# Patient Record
Sex: FEMALE | Race: WHITE
[De-identification: names, ages, dates, MRNs, and addresses within clinical notes are randomized per-mention and may not be internally consistent; named-entity substitution may affect disease eponyms.]

---

## 2017-05-24 ENCOUNTER — HOSPITAL ENCOUNTER (EMERGENCY)
Dept: HOSPITAL 17 - PHED | Age: 22
LOS: 1 days | Discharge: HOME | End: 2017-05-25
Payer: COMMERCIAL

## 2017-05-24 VITALS
DIASTOLIC BLOOD PRESSURE: 88 MMHG | SYSTOLIC BLOOD PRESSURE: 114 MMHG | OXYGEN SATURATION: 100 % | HEART RATE: 110 BPM | RESPIRATION RATE: 18 BRPM | TEMPERATURE: 98.4 F

## 2017-05-24 VITALS — BODY MASS INDEX: 28.46 KG/M2 | WEIGHT: 135.58 LBS | HEIGHT: 58 IN

## 2017-05-24 VITALS — DIASTOLIC BLOOD PRESSURE: 69 MMHG | HEART RATE: 86 BPM | OXYGEN SATURATION: 99 % | SYSTOLIC BLOOD PRESSURE: 117 MMHG

## 2017-05-24 DIAGNOSIS — N83.201: Primary | ICD-10-CM

## 2017-05-24 DIAGNOSIS — J45.909: ICD-10-CM

## 2017-05-24 DIAGNOSIS — R11.2: ICD-10-CM

## 2017-05-24 LAB
ALP SERPL-CCNC: 86 U/L (ref 45–117)
ALT SERPL-CCNC: 23 U/L (ref 10–53)
ANION GAP SERPL CALC-SCNC: 8 MEQ/L (ref 5–15)
APTT BLD: 26.8 SEC (ref 24.3–30.1)
AST SERPL-CCNC: 23 U/L (ref 15–37)
BACTERIA #/AREA URNS HPF: (no result) /HPF
BASOPHILS # BLD AUTO: 0.2 TH/MM3 (ref 0–0.2)
BASOPHILS NFR BLD: 1.6 % (ref 0–2)
BILIRUB INDIRECT SERPL-MCNC: 0.2 MG/DL (ref 0–0.8)
BILIRUB SERPL-MCNC: 0.3 MG/DL (ref 0.2–1)
BUN SERPL-MCNC: 13 MG/DL (ref 7–18)
CHLORIDE SERPL-SCNC: 102 MEQ/L (ref 98–107)
COLOR UR: (no result)
COMMENT (UR): (no result)
CULTURE IF INDICATED: (no result)
EOSINOPHIL # BLD: 0.1 TH/MM3 (ref 0–0.4)
EOSINOPHIL NFR BLD: 0.6 % (ref 0–4)
ERYTHROCYTE [DISTWIDTH] IN BLOOD BY AUTOMATED COUNT: 11.9 % (ref 11.6–17.2)
GFR SERPLBLD BASED ON 1.73 SQ M-ARVRAT: 75 ML/MIN (ref 89–?)
GLUCOSE UR STRIP-MCNC: (no result) MG/DL
HCO3 BLD-SCNC: 30.2 MEQ/L (ref 21–32)
HCT VFR BLD CALC: 47.7 % (ref 35–46)
HEMO FLAGS: (no result)
HGB UR QL STRIP: (no result)
INR PPP: 1 RATIO
KETONES UR STRIP-MCNC: (no result) MG/DL
LEUKOCYTE ESTERASE UR QL STRIP: (no result) /HPF (ref 0–5)
LYMPHOCYTES # BLD AUTO: 2.1 TH/MM3 (ref 1–4.8)
LYMPHOCYTES NFR BLD AUTO: 15.8 % (ref 9–44)
MCH RBC QN AUTO: 30.8 PG (ref 27–34)
MCHC RBC AUTO-ENTMCNC: 34.3 % (ref 32–36)
MCV RBC AUTO: 89.8 FL (ref 80–100)
MONOCYTES NFR BLD: 4.4 % (ref 0–8)
NEUTROPHILS # BLD AUTO: 10 TH/MM3 (ref 1.8–7.7)
NEUTROPHILS NFR BLD AUTO: 77.6 % (ref 16–70)
NITRITE UR QL STRIP: (no result)
PLATELET # BLD: 369 TH/MM3 (ref 150–450)
POTASSIUM SERPL-SCNC: 3.4 MEQ/L (ref 3.5–5.1)
PROTHROMBIN TIME: 10.7 SEC (ref 9.8–11.6)
RBC # BLD AUTO: 5.31 MIL/MM3 (ref 4–5.3)
RBC #/AREA URNS HPF: (no result) /HPF (ref 0–3)
SODIUM SERPL-SCNC: 140 MEQ/L (ref 136–145)
SP GR UR STRIP: 1.01 (ref 1–1.03)
SQUAMOUS #/AREA URNS HPF: > 8 /HPF (ref 0–5)
WBC # BLD AUTO: 13 TH/MM3 (ref 4–11)

## 2017-05-24 PROCEDURE — 83690 ASSAY OF LIPASE: CPT

## 2017-05-24 PROCEDURE — 74177 CT ABD & PELVIS W/CONTRAST: CPT

## 2017-05-24 PROCEDURE — 85025 COMPLETE CBC W/AUTO DIFF WBC: CPT

## 2017-05-24 PROCEDURE — 85730 THROMBOPLASTIN TIME PARTIAL: CPT

## 2017-05-24 PROCEDURE — 96375 TX/PRO/DX INJ NEW DRUG ADDON: CPT

## 2017-05-24 PROCEDURE — 96361 HYDRATE IV INFUSION ADD-ON: CPT

## 2017-05-24 PROCEDURE — 81001 URINALYSIS AUTO W/SCOPE: CPT

## 2017-05-24 PROCEDURE — 84703 CHORIONIC GONADOTROPIN ASSAY: CPT

## 2017-05-24 PROCEDURE — 99285 EMERGENCY DEPT VISIT HI MDM: CPT

## 2017-05-24 PROCEDURE — 85610 PROTHROMBIN TIME: CPT

## 2017-05-24 PROCEDURE — 96374 THER/PROPH/DIAG INJ IV PUSH: CPT

## 2017-05-24 PROCEDURE — 80076 HEPATIC FUNCTION PANEL: CPT

## 2017-05-24 PROCEDURE — 80048 BASIC METABOLIC PNL TOTAL CA: CPT

## 2017-05-24 PROCEDURE — 87086 URINE CULTURE/COLONY COUNT: CPT

## 2017-05-24 NOTE — RADHPO
EXAM DATE/TIME:  05/24/2017 23:20 

 

[facility] COMPARISON:     

No previous studies available for comparison.

 

 

INDICATIONS :     

Abdominal pain with nausea and vomiting.

                      

 

IV CONTRAST:     

100 cc Omnipaque 350 (iohexol) IV 

 

 

ORAL CONTRAST:      

No oral contrast ingested.

                      

 

RADIATION DOSE:     

8.96 CTDIvol (mGy) 

 

 

MEDICAL HISTORY :     

None  

 

SURGICAL HISTORY :      

None. 

 

ENCOUNTER:      

Initial

 

ACUITY:      

1 day

 

PAIN SCALE:      

6/10

 

LOCATION:         

abdomen

 

TECHNIQUE:     

Volumetric scanning of the abdomen and pelvis was performed.  Using automated exposure control and ad
justment of the mA and/or kV according to patient size, radiation dose was kept as low as reasonably 
achievable to obtain optimal diagnostic quality images. 

 

FINDINGS:     

 

LOWER LUNGS:     

The visualized lower lungs are clear.

 

LIVER:     

Homogeneous density without lesion.  There is no dilation of the biliary tree.  No calcified gallston
es.

 

SPLEEN:     

Normal size without lesion.

 

PANCREAS:     

Within normal limits.

 

KIDNEYS:     

Normal in size and shape.  There is no mass, stone or hydronephrosis.

 

ADRENAL GLANDS:     

Within normal limits.

 

VASCULAR:     

There is no aortic aneurysm.

 

BOWEL/MESENTERY:     

The stomach, small bowel, and colon demonstrate no acute abnormality.  There is no free intraperitone
al air or fluid.

 

ABDOMINAL WALL:     

Within normal limits.

 

RETROPERITONEUM:     

There is no lymphadenopathy. 

 

BLADDER:     

No wall thickening or mass. 

 

REPRODUCTIVE:     

3 cm right ovarian cyst.

 

INGUINAL:     

There is no lymphadenopathy or hernia. 

 

MUSCULOSKELETAL:     

Within normal limits for patient age. 

 

CONCLUSION:     

3 cm right ovarian cyst otherwise unremarkable CT scan of the abdomen and pelvis. Some mixed density 
within the gallbladder may indicate a noncalcified gallstone. Nothing to suggest acute cholecystitis

 

 

 

 Lino Frazier MD on May 24, 2017 at 23:42           

Board Certified Radiologist.

 This report was verified electronically.

## 2017-05-24 NOTE — PD
HPI


Chief Complaint:  GI Complaint


Time Seen by Provider:  22:14


Travel History


International Travel<30 days:  No


Contact w/Intl Traveler<30days:  No


Traveled to known affect area:  No





History of Present Illness


HPI


Patient is a 21-year-old female comes in complaining of abdominal pain.  She 

says that for the past few weeks she has been feeling very nauseous and has 

vomited a few times.  However she started to have right-sided abdominal pain a 

few days ago.  She says the pain is gotten worse, and her friends and family 

told her she needed to come to the hospital.  She says the pain is mostly in 

her right upper quadrant, but goes down the right side.  She denies any 

dysuria.  The last time she vomited was a few days ago.  She has not had any 

fever or chills.





PFSH


Past Medical History


Asthma:  Yes


Diminished Hearing:  No


Tetanus Vaccination:  > 5 Years


Influenza Vaccination:  No


Pregnant?:  Unknown


LMP:  5-3-17 irregular


:  2


Para:  0


Miscarriage:  2


:  0





Past Surgical History


Oral Surgery:  Yes (WISDOM TEETH)





Social History


Alcohol Use:  Yes (SOC)


Tobacco Use:  No


Substance Use:  No





Allergies-Medications


(Allergen,Severity, Reaction):  


Coded Allergies:  


     No Known Allergies (Unverified , 17)


Reported Meds & Prescriptions








Reported Meds & Active Scripts


Active


Lortab (Hydrocodone-Acetaminophen) 5-325 Mg Tab 1 Tab PO Q6H PRN


Zofran Odt (Ondansetron Odt) 4 Mg Tab 4 Mg SL Q6HR PRN











Review of Systems


Except as stated in HPI:  all other systems reviewed are Neg


General / Constitutional:  No: Fever, Chills


HENT:  No: Headaches


Cardiovascular:  No: Chest Pain or Discomfort


Respiratory:  No: Shortness of Breath


Gastrointestinal:  Positive: Nausea, Vomiting, Abdominal Pain,  No: Diarrhea


Genitourinary:  No: Dysuria, Discharge, Vaginal Bleeding


Skin:  No Rash, No Change in Pigmentation


Neurologic:  No: Weakness, Dizziness





Physical Exam


Narrative


GENERAL: Awake and alert, no acute distress.


SKIN: Focused skin assessment warm/dry.


HEAD: Atraumatic. Normocephalic. 


EYES: Pupils equal and round. No scleral icterus. 


ENT: Mucous membranes pink and moist.


NECK: Trachea midline. No JVD. 


CARDIOVASCULAR: Regular rate and rhythm.  No murmur appreciated.


RESPIRATORY: No accessory muscle use. Clear to auscultation. Breath sounds 

equal bilaterally. 


GASTROINTESTINAL: Abdomen soft, nondistended.  Tender to palpation of the right 

upper quadrant and right lower quadrant.  No rebound or guarding.  No CVA 

tenderness.


MUSCULOSKELETAL: No obvious deformities. No clubbing.  No cyanosis.  No edema. 


NEUROLOGICAL: Awake and alert. No obvious cranial nerve deficits.  Motor 

grossly within normal limits. Normal speech.


PSYCHIATRIC: Appropriate mood and affect; insight and judgment normal.





Data


Data


Last Documented VS





Vital Signs








  Date Time  Temp Pulse Resp B/P Pulse Ox O2 Delivery O2 Flow Rate FiO2


 


17 23:49  86  117/69 99 Room Air  


 


17 21:52 98.4  18     








Orders





 Basic Metabolic Panel (Bmp) (17 22:14)


Complete Blood Count With Diff (17 22:14)


Lipase (17 22:14)


Prothrombin Time / Inr (Pt) (17 22:14)


Act Partial Throm Time (Ptt) (17 22:14)


Urinalysis - C+S If Indicated (17 22:14)


Ua Includes Microscopic (17 22:14)


Ct Abd/Pel W Iv Contrast(Rout) (17 22:14)


Iv Access Insert/Monitor (17 22:14)


Ecg Monitoring (17 22:14)


Oximetry (17 22:14)


Ondansetron Inj (Zofran Inj) (17 22:15)


Sodium Chlor 0.9% 1000 Ml Inj (Ns 1000 M (17 22:14)


Sodium Chloride 0.9% Flush (Ns Flush) (17 22:15)


Ed Urine Pregnancytest Poc (17 22:14)


Hepatic Functional Panel (17 22:14)


Ketorolac Inj (Toradol Inj) (17 22:15)


Urine Culture (17 22:19)


Iohexol 350 Inj (Omnipaque 350 Inj) (17 23:29)





Labs





 Laboratory Tests








Test 17





 22:19 23:05


 


Urine Color STRAW  


 


Urine Turbidity SLIGHT  


 


Urine pH 6.0  


 


Urine Specific Gravity 1.006  


 


Urine Protein NEG mg/dL 


 


Urine Glucose (UA) NEG mg/dL 


 


Urine Ketones NEG mg/dL 


 


Urine Occult Blood NEG  


 


Urine Nitrite NEG  


 


Urine Bilirubin NEG  


 


Urine Leukocyte Esterase NEG  


 


Urine RBC 0-2 /hpf 


 


Urine WBC 0-2 /hpf 


 


Urine Squamous Epithelial > 8 /hpf 





Cells  


 


Urine Bacteria MANY /hpf 


 


Microscopic Urinalysis Comment CULTURE 





 INDICATED 


 


White Blood Count  13.0 TH/MM3


 


Red Blood Count  5.31 MIL/MM3


 


Hemoglobin  16.4 GM/DL


 


Hematocrit  47.7 %


 


Mean Corpuscular Volume  89.8 FL


 


Mean Corpuscular Hemoglobin  30.8 PG


 


Mean Corpuscular Hemoglobin  34.3 %





Concent  


 


Red Cell Distribution Width  11.9 %


 


Platelet Count  369 TH/MM3


 


Mean Platelet Volume  8.8 FL


 


Neutrophils (%) (Auto)  77.6 %


 


Lymphocytes (%) (Auto)  15.8 %


 


Monocytes (%) (Auto)  4.4 %


 


Eosinophils (%) (Auto)  0.6 %


 


Basophils (%) (Auto)  1.6 %


 


Neutrophils # (Auto)  10.0 TH/MM3


 


Lymphocytes # (Auto)  2.1 TH/MM3


 


Monocytes # (Auto)  0.6 TH/MM3


 


Eosinophils # (Auto)  0.1 TH/MM3


 


Basophils # (Auto)  0.2 TH/MM3


 


CBC Comment  DIFF FINAL 


 


Differential Comment   


 


Prothrombin Time  10.7 SEC


 


Prothromb Time International  1.0 RATIO





Ratio  


 


Activated Partial  26.8 SEC





Thromboplast Time  


 


Sodium Level  140 MEQ/L


 


Potassium Level  3.4 MEQ/L


 


Chloride Level  102 MEQ/L


 


Carbon Dioxide Level  30.2 MEQ/L


 


Anion Gap  8 MEQ/L


 


Blood Urea Nitrogen  13 MG/DL


 


Creatinine  0.93 MG/DL


 


Estimat Glomerular Filtration  75 ML/MIN





Rate  


 


Random Glucose  100 MG/DL


 


Calcium Level  9.1 MG/DL


 


Total Bilirubin  0.3 MG/DL


 


Direct Bilirubin  LESS THAN 0.1





  MG/DL


 


Indirect Bilirubin  0.2 MG/DL


 


Aspartate Amino Transf  23 U/L





(AST/SGOT)  


 


Alanine Aminotransferase  23 U/L





(ALT/SGPT)  


 


Alkaline Phosphatase  86 U/L


 


Total Protein  8.0 GM/DL


 


Albumin  4.0 GM/DL


 


Lipase  244 U/L











[facility]


Medical Decision Making


Medical Screen Exam Complete:  Yes


Emergency Medical Condition:  Yes


Medical Record Reviewed:  Yes


Differential Diagnosis


UTI versus pyelonephritis versus appendicitis versus cholecystitis


Narrative Course


Patient is a 21-year-old female comes in complaining of nausea, vomiting, 

abdominal pain.  Exam shows tenderness to the right side of the abdomen.  IV 

established, labs sent.  Labs show an elevated white blood cell count of 13, no 

other acute abnormalities.  Patient given IV fluids, Zofran, Toradol.





CT abd/pelvis shows ovarian cyst, no other acute abnormalities.  





Patient informed of the results.  Advised to follow up with gyn.  Advised to 

follow up with surgery regarding gallstones, at this time she has no evidence 

of cholecystitis.  Advised to return to the ED as needed for any worsening 

symptoms.





Diagnosis





 Primary Impression:  


 Ovarian cyst


Patient Instructions:  General Instructions, Ovarian Cyst (ED)





***Additional Instructions:


Follow up with your primary care doctor and gyn.  Take Ibuprofen or Naproxen as 

needed for pain.  You can take a Tramadol for severe pain.  Return to the ED as 

needed for any worsening symptoms.


Disposition:  01 DISCHARGE HOME


Condition:  Stable








Kira Fernandez MD May 24, 2017 23:43

## 2017-05-28 ENCOUNTER — HOSPITAL ENCOUNTER (OUTPATIENT)
Dept: HOSPITAL 17 - NEPD | Age: 22
Setting detail: OBSERVATION
LOS: 3 days | Discharge: HOME | End: 2017-05-31
Attending: SURGERY | Admitting: SURGERY
Payer: MEDICAID

## 2017-05-28 VITALS
OXYGEN SATURATION: 98 % | HEART RATE: 91 BPM | RESPIRATION RATE: 17 BRPM | DIASTOLIC BLOOD PRESSURE: 62 MMHG | SYSTOLIC BLOOD PRESSURE: 113 MMHG

## 2017-05-28 VITALS
HEART RATE: 94 BPM | SYSTOLIC BLOOD PRESSURE: 129 MMHG | RESPIRATION RATE: 16 BRPM | DIASTOLIC BLOOD PRESSURE: 78 MMHG | OXYGEN SATURATION: 98 %

## 2017-05-28 VITALS — BODY MASS INDEX: 27.77 KG/M2 | HEIGHT: 58 IN | WEIGHT: 132.28 LBS

## 2017-05-28 VITALS
SYSTOLIC BLOOD PRESSURE: 115 MMHG | TEMPERATURE: 98 F | OXYGEN SATURATION: 98 % | DIASTOLIC BLOOD PRESSURE: 73 MMHG | RESPIRATION RATE: 21 BRPM | HEART RATE: 80 BPM

## 2017-05-28 VITALS
DIASTOLIC BLOOD PRESSURE: 73 MMHG | SYSTOLIC BLOOD PRESSURE: 115 MMHG | OXYGEN SATURATION: 98 % | RESPIRATION RATE: 16 BRPM | HEART RATE: 94 BPM

## 2017-05-28 VITALS
HEART RATE: 78 BPM | RESPIRATION RATE: 16 BRPM | OXYGEN SATURATION: 99 % | DIASTOLIC BLOOD PRESSURE: 92 MMHG | SYSTOLIC BLOOD PRESSURE: 146 MMHG | TEMPERATURE: 98.6 F

## 2017-05-28 DIAGNOSIS — K38.8: Primary | ICD-10-CM

## 2017-05-28 DIAGNOSIS — K80.10: ICD-10-CM

## 2017-05-28 DIAGNOSIS — J45.909: ICD-10-CM

## 2017-05-28 LAB
ALP SERPL-CCNC: 81 U/L (ref 45–117)
ALT SERPL-CCNC: 24 U/L (ref 10–53)
ANION GAP SERPL CALC-SCNC: 9 MEQ/L (ref 5–15)
APTT BLD: 28 SEC (ref 24.3–30.1)
AST SERPL-CCNC: 21 U/L (ref 15–37)
BASOPHILS # BLD AUTO: 0 TH/MM3 (ref 0–0.2)
BASOPHILS NFR BLD: 0.4 % (ref 0–2)
BILIRUB SERPL-MCNC: 0.3 MG/DL (ref 0.2–1)
BUN SERPL-MCNC: 13 MG/DL (ref 7–18)
CHLORIDE SERPL-SCNC: 102 MEQ/L (ref 98–107)
COLOR UR: (no result)
COMMENT (UR): (no result)
CULTURE IF INDICATED: (no result)
EOSINOPHIL # BLD: 0.2 TH/MM3 (ref 0–0.4)
EOSINOPHIL NFR BLD: 1.5 % (ref 0–4)
ERYTHROCYTE [DISTWIDTH] IN BLOOD BY AUTOMATED COUNT: 13 % (ref 11.6–17.2)
GFR SERPLBLD BASED ON 1.73 SQ M-ARVRAT: 74 ML/MIN (ref 89–?)
GLUCOSE UR STRIP-MCNC: (no result) MG/DL
HCO3 BLD-SCNC: 28.2 MEQ/L (ref 21–32)
HCT VFR BLD CALC: 44.6 % (ref 35–46)
HEMO FLAGS: (no result)
HGB UR QL STRIP: (no result)
INR PPP: 0.9 RATIO
KETONES UR STRIP-MCNC: (no result) MG/DL
LYMPHOCYTES # BLD AUTO: 2 TH/MM3 (ref 1–4.8)
LYMPHOCYTES NFR BLD AUTO: 19.2 % (ref 9–44)
MCH RBC QN AUTO: 30.7 PG (ref 27–34)
MCHC RBC AUTO-ENTMCNC: 33.9 % (ref 32–36)
MCV RBC AUTO: 90.7 FL (ref 80–100)
MONOCYTES NFR BLD: 5 % (ref 0–8)
NEUTROPHILS # BLD AUTO: 7.9 TH/MM3 (ref 1.8–7.7)
NEUTROPHILS NFR BLD AUTO: 73.9 % (ref 16–70)
NITRITE UR QL STRIP: (no result)
PLATELET # BLD: 314 TH/MM3 (ref 150–450)
POTASSIUM SERPL-SCNC: 3.5 MEQ/L (ref 3.5–5.1)
PROTHROMBIN TIME: 10.2 SEC (ref 9.8–11.6)
RBC # BLD AUTO: 4.91 MIL/MM3 (ref 4–5.3)
SODIUM SERPL-SCNC: 139 MEQ/L (ref 136–145)
SP GR UR STRIP: 1 (ref 1–1.03)
SQUAMOUS #/AREA URNS HPF: 1 /HPF (ref 0–5)
WBC # BLD AUTO: 10.6 TH/MM3 (ref 4–11)

## 2017-05-28 PROCEDURE — 85025 COMPLETE CBC W/AUTO DIFF WBC: CPT

## 2017-05-28 PROCEDURE — 96374 THER/PROPH/DIAG INJ IV PUSH: CPT

## 2017-05-28 PROCEDURE — G0378 HOSPITAL OBSERVATION PER HR: HCPCS

## 2017-05-28 PROCEDURE — 47562 LAPAROSCOPIC CHOLECYSTECTOMY: CPT

## 2017-05-28 PROCEDURE — 94640 AIRWAY INHALATION TREATMENT: CPT

## 2017-05-28 PROCEDURE — 85730 THROMBOPLASTIN TIME PARTIAL: CPT

## 2017-05-28 PROCEDURE — 99285 EMERGENCY DEPT VISIT HI MDM: CPT

## 2017-05-28 PROCEDURE — 80053 COMPREHEN METABOLIC PANEL: CPT

## 2017-05-28 PROCEDURE — C9113 INJ PANTOPRAZOLE SODIUM, VIA: HCPCS

## 2017-05-28 PROCEDURE — 96375 TX/PRO/DX INJ NEW DRUG ADDON: CPT

## 2017-05-28 PROCEDURE — 84703 CHORIONIC GONADOTROPIN ASSAY: CPT

## 2017-05-28 PROCEDURE — 44970 LAPAROSCOPY APPENDECTOMY: CPT

## 2017-05-28 PROCEDURE — 76705 ECHO EXAM OF ABDOMEN: CPT

## 2017-05-28 PROCEDURE — 83690 ASSAY OF LIPASE: CPT

## 2017-05-28 PROCEDURE — 85610 PROTHROMBIN TIME: CPT

## 2017-05-28 PROCEDURE — 81001 URINALYSIS AUTO W/SCOPE: CPT

## 2017-05-28 PROCEDURE — 88304 TISSUE EXAM BY PATHOLOGIST: CPT

## 2017-05-28 RX ADMIN — PHENYTOIN SODIUM SCH MLS/HR: 50 INJECTION INTRAMUSCULAR; INTRAVENOUS at 18:35

## 2017-05-28 RX ADMIN — Medication SCH ML: at 20:55

## 2017-05-28 RX ADMIN — TAZOBACTAM SODIUM AND PIPERACILLIN SODIUM SCH MLS/HR: 375; 3 INJECTION, SOLUTION INTRAVENOUS at 19:53

## 2017-05-28 RX ADMIN — PANTOPRAZOLE SODIUM SCH MG: 40 INJECTION, POWDER, FOR SOLUTION INTRAVENOUS at 18:57

## 2017-05-28 NOTE — PD
HPI


Chief Complaint:  Abdominal Pain


Time Seen by Provider:  16:41


Travel History


International Travel<30 days:  No


Contact w/Intl Traveler<30days:  No


Traveled to known affect area:  No





History of Present Illness


HPI


[age] female complains of abdominal pain with nausea vomiting.  Patient states 

that she has intermittent right upper quadrant abdominal pain with nausea 

vomiting for the past 6 weeks.  Patient states that the symptoms are worse a 

week ago.  Patient was seen in emergency room 3 days ago.  Patient had blood 

tests and CT scan abdomen pelvis done.  CBC with elevated white count.  CT scan 

abdomen and pelvis at that time shows mixed density within the gallbladder may 

indicate  noncalcified gallstone.  No evidence of acute cholecystitis at that 

time.  Patient was discharged home with pain medication and advised to follow-

up with personal physician and surgeon.  She states that the pain and nausea 

vomiting got worse since then.  Patient also has intermittent fever and chills.

  Patient states the pain is sharp pain localized to right upper quadrant in 

the abdomen.  The pain is worse with coughing and deep breathing.  On a scale 

of 1-10 the pain is a 9.  Patient denies any dysuria or frequency.  Patient 

denies any vaginal discharge or bleeding.





PFSH


Past Medical History


Asthma:  Yes


Diminished Hearing:  No


Gastrointestinal Disorders:  Yes


Reproductive:  Yes


Tetanus Vaccination:  > 5 Years


Influenza Vaccination:  No


Pregnant?:  Not Pregnant


LMP:  17


:  2


Para:  0


Miscarriage:  2


:  0





Past Surgical History


Oral Surgery:  Yes (WISDOM TEETH)





Social History


Alcohol Use:  Yes (SOC)


Tobacco Use:  No


Substance Use:  No





Allergies-Medications


(Allergen,Severity, Reaction):  


Coded Allergies:  


     No Known Allergies (Unverified , 17)


Reported Meds & Prescriptions





Reported Meds & Active Scripts


Active


Lortab (Hydrocodone-Acetaminophen) 5-325 Mg Tab 1 Tab PO Q6H PRN


Zofran Odt (Ondansetron Odt) 4 Mg Tab 4 Mg SL Q6HR PRN








Review of Systems


General / Constitutional:  No: Fever


Eyes:  No: Visual changes


HENT:  No: Headaches


Cardiovascular:  No: Chest Pain or Discomfort


Respiratory:  No: Shortness of Breath


Gastrointestinal:  Positive: Nausea, Vomiting, Abdominal Pain


Genitourinary:  No: Dysuria


Musculoskeletal:  No: Pain


Skin:  No Rash


Neurologic:  No: Weakness


Psychiatric:  No: Depression


Endocrine:  No: Polydipsia


Hematologic/Lymphatic:  No: Easy Bruising





Physical Exam


Narrative


GENERAL: Well-nourished, well-developed patient.


SKIN: Focused skin assessment warm/dry.


HEAD: Normocephalic.


EYES: No scleral icterus. No injection or drainage. 


NECK: Supple, trachea midline. No JVD or lymphadenopathy.


CARDIOVASCULAR: Regular rate and rhythm without murmurs, gallops, or rubs. 


RESPIRATORY: Breath sounds equal bilaterally. No accessory muscle use.


GASTROINTESTINAL: Abdomen soft, nondistended.  Patient has moderate tenderness 

on palpation right upper quadrant of the abdomen.  No rebound tenderness.  No 

mass.


MUSCULOSKELETAL: No cyanosis, or edema. 


BACK: Nontender without obvious deformity. No CVA tenderness.





Data


Data


Last Documented VS





Vital Signs








  Date Time  Temp Pulse Resp B/P Pulse Ox O2 Delivery O2 Flow Rate FiO2


 


17 18:00  94 16 115/73 98 Room Air  


 


17 16:32 98.6       








Orders





 Complete Blood Count With Diff (17 16:53)


Comprehensive Metabolic Panel (17 16:53)


Lipase (17 16:53)


Prothrombin Time / Inr (Pt) (17 16:53)


Act Partial Throm Time (Ptt) (17 16:53)


Urinalysis - C+S If Indicated (17 16:53)


Us Abdomen Gallbladder (17 )


Iv Access Insert/Monitor (17 16:53)


Ecg Monitoring (17 16:53)


Oximetry (17 16:53)


Morphine Inj (Morphine Inj) (17 17:00)


Ondansetron Inj (Zofran Inj) (17 17:00)


Pantoprazole Inj (Protonix Inj) (17 17:00)


Sodium Chlor 0.9% 1000 Ml Inj (Ns 1000 M (17 16:53)


Ed Urine Pregnancytest Poc (17 16:53)


Admit Order (Ed Use Only) (17 18:21)





Labs





 Laboratory Tests








Test 17





 17:00 17:25


 


White Blood Count 10.6 TH/MM3 


 


Red Blood Count 4.91 MIL/MM3 


 


Hemoglobin 15.1 GM/DL 


 


Hematocrit 44.6 % 


 


Mean Corpuscular Volume 90.7 FL 


 


Mean Corpuscular Hemoglobin 30.7 PG 


 


Mean Corpuscular Hemoglobin 33.9 % 





Concent  


 


Red Cell Distribution Width 13.0 % 


 


Platelet Count 314 TH/MM3 


 


Mean Platelet Volume 8.9 FL 


 


Neutrophils (%) (Auto) 73.9 % 


 


Lymphocytes (%) (Auto) 19.2 % 


 


Monocytes (%) (Auto) 5.0 % 


 


Eosinophils (%) (Auto) 1.5 % 


 


Basophils (%) (Auto) 0.4 % 


 


Neutrophils # (Auto) 7.9 TH/MM3 


 


Lymphocytes # (Auto) 2.0 TH/MM3 


 


Monocytes # (Auto) 0.5 TH/MM3 


 


Eosinophils # (Auto) 0.2 TH/MM3 


 


Basophils # (Auto) 0.0 TH/MM3 


 


CBC Comment DIFF FINAL  


 


Differential Comment   


 


Prothrombin Time 10.2 SEC 


 


Prothromb Time International 0.9 RATIO 





Ratio  


 


Activated Partial 28.0 SEC 





Thromboplast Time  


 


Sodium Level 139 MEQ/L 


 


Potassium Level 3.5 MEQ/L 


 


Chloride Level 102 MEQ/L 


 


Carbon Dioxide Level 28.2 MEQ/L 


 


Anion Gap 9 MEQ/L 


 


Blood Urea Nitrogen 13 MG/DL 


 


Creatinine 0.95 MG/DL 


 


Estimat Glomerular Filtration 74 ML/MIN 





Rate  


 


Random Glucose 111 MG/DL 


 


Calcium Level 8.8 MG/DL 


 


Total Bilirubin 0.3 MG/DL 


 


Aspartate Amino Transf 21 U/L 





(AST/SGOT)  


 


Alanine Aminotransferase 24 U/L 





(ALT/SGPT)  


 


Alkaline Phosphatase 81 U/L 


 


Total Protein 7.4 GM/DL 


 


Albumin 3.7 GM/DL 


 


Lipase 199 U/L 


 


Urine Color  LIGHT-YELLOW 


 


Urine Turbidity  CLEAR 


 


Urine pH  6.5 


 


Urine Specific Gravity  1.005 


 


Urine Protein  NEG mg/dL


 


Urine Glucose (UA)  NEG mg/dL


 


Urine Ketones  NEG mg/dL


 


Urine Occult Blood  NEG 


 


Urine Nitrite  NEG 


 


Urine Bilirubin  NEG 


 


Urine Urobilinogen  LESS THAN 2.0





  MG/DL


 


Urine Leukocyte Esterase  NEG 


 


Urine RBC  LESS THAN 1





  /hpf


 


Urine WBC  LESS THAN 1





  /hpf


 


Urine Squamous Epithelial  1 /hpf





Cells  


 


Microscopic Urinalysis Comment  CULT NOT





  INDICATED











MDM


Medical Decision Making


Medical Screen Exam Complete:  Yes


Emergency Medical Condition:  Yes


Interpretation(s)


1743 PM.  CBC within normal limit.  CMP within normal limit.


Differential Diagnosis


Differential diagnosis including acute cholecystitis, cholangitis, pancreatitis

, colitis, UTI, pyelonephritis, nephrolithiasis.


Narrative Course


21-year-old female with recurrent right upper quadrant abdominal pain and 

nausea vomiting.  Normal saline solution 1 25 cc an hour.  Morphine 2 mg IV.  

Zofran 4 mg IV.  Protonix 40 mg IV.  Zosyn 3.375 g IV.





Diagnosis





 Primary Impression:  


 Cholelithiasis


 Qualified Code:  K80.20 - Calculus of gallbladder without cholecystitis 

without obstruction


 Additional Impression:  


 Abdominal pain


 Qualified Code:  R10.11 - Right upper quadrant abdominal pain





Admitting Information


Admitting Physician Requests:  Observation








Andres Rios MD May 28, 2017 17:04

## 2017-05-28 NOTE — RADRPT
EXAM DATE/TIME:  05/28/2017 17:40 

 

[facility] COMPARISON:     

No previous studies available for comparison.

        

 

 

INDICATIONS :     

Right upper quadrant pain.

                     

 

MEDICAL HISTORY :     

Pregnancy.     Asthma.

 

SURGICAL HISTORY :     

None.     

 

ENCOUNTER:     

Initial

 

ACUITY:     

1 month

 

PAIN SCORE:     

8/10

 

LOCATION:     

Right upper quadrant 

                     

MEASUREMENTS:     

 

LIVER:     

12.9 cm length 

 

COMMON DUCT:     

5 mm

 

RIGHT KIDNEY:     

9.6 x 3.9 x 3.9 cm

 

FINDINGS:     

 

LIVER:     

Normal echotexture without focal lesion or ductal dilatation.  

 

COMMON DUCT:     

No intraluminal mass or stone visualized.  

 

GALLBLADDER:          

Gallstone is identified within the gallbladder measuring 12 mm. No significant wall thickening, peric
holecystic fluid or sonographic Foy's sign is noted. 

 

PANCREAS:          

The visualized portions are within normal limits.  

 

RIGHT KIDNEY:          

No evidence of hydronephrosis, stone, or mass.  

 

CONCLUSION:     Cholelithiasis. Otherwise unremarkable sonogram of the abdomen. 

 

 

 Sarmad Seay MD on May 28, 2017 at 18:32           

Board Certified Radiologist.

 This report was verified electronically.

## 2017-05-28 NOTE — MH
cc:

LOUISE BHANDARI

****

 

 

DATE OF CONSULTATION

5/28/17

 

CHIEF COMPLAINT

Acute cholecystitis.

 

HISTORY OF PRESENT ILLNESS

The patient is a 21-year-old female with a known history of gallstones and

right upper quadrant pain who developed severe unrelenting 10/10 pain in

epigastric area earlier today.  The patient's presented to the emergency

department, underwent evaluation by Dr. Rios, the emergency room physician and

was found to have positive Foy sign as well as borderline elevated white

blood cell count with a left shift concerning for early acute cholecystitis.

General surgery was consulted.  The patient states that she has had several

weeks of increasing right upper quadrant pain, nausea, vomiting related to

fatty food intake.  However, this episode was sudden and more acute and more

severe than any previous episode and has not been relieved.  Ultrasound

performed of the gallbladder did show cholelithiasis.

 

REVIEW OF SYSTEMS

12 point review of systems was reviewed with the patient and is negative except

for the pertinent positives mentioned above in the history of present illness.

 

PAST MEDICAL HISTORY

Asthma.

 

PAST SURGICAL HISTORY

[place] teeth extraction.

 

SOCIAL HISTORY

The patient socially uses alcohol.  Denies drug or tobacco use.

 

ALLERGIES

No known drug allergies.

 

MEDICATIONS

1. Lortab.

2. Zofran prescribed for above biliary disease.

 

FAMILY HISTORY

Multiple family members with gallstones requiring cholecystectomy.

 

PHYSICAL EXAMINATION

VITAL SIGNS: The temperature 98.6 degrees, pulse 94, respiratory rate 16, blood

pressure 115/72, O2 saturation 98%.

GENERAL: The patient is a well-developed, well-nourished  female in no

acute distress.

HEAD:  Normocephalic, atraumatic.  Pupils are round, reactive to accommodation

and light. Sclerae is anicteric. Oral cavity is clear.

NECK:  Supple.  No JVD.

LUNGS:  Clear to auscultation bilaterally.

HEART:  Regular rate and rhythm.

ABDOMEN:  Soft, tender to palpation in the right upper quadrant.  Positive

Foy sign with deep inspiration.  Negative tenderness over McBurney's point.

No surgical scars.  No hernias.  No ascites.  Normal bowel sounds.

BACK:  No CVA tenderness.

EXTREMITIES:  No clubbing, cyanosis or edema.

NEUROLOGIC:  The patient is awake, alert, oriented x3.  Nonfocal peripheral

exam.  Cranial nerves II-XII are grossly intact.

 

ASSESSMENT/PLAN

The patient is a 21-year-old female with history of biliary colic now with

likely early acute cholecystitis.  I discussed this with the patient and

recommend admission with IV antibiotics and cholecystectomy. She agrees to this

plan and would like to undergo admission for planned early cholecystectomy.

The risks, benefits, alternatives to laparoscopic cholecystectomy for treatment

of biliary colic as well as acute cholecystitis were discussed with the patient

and her family. All of their questions were answered to their satisfaction.

Will admit the patient, maintain her of a strict low-fat diet. Start her on IV

antibiotics and planned operative intervention in the next 24 to 48 hours.

 

 

 

                              _________________________________

                              MD SMITA AbbottG/PATRICIA

D:  5/28/2017/10:14 PM

T:  5/28/2017/10:44 PM

Visit #:  [id number]

Job #:  [id number]

## 2017-05-29 VITALS
SYSTOLIC BLOOD PRESSURE: 98 MMHG | OXYGEN SATURATION: 95 % | HEART RATE: 72 BPM | TEMPERATURE: 98 F | DIASTOLIC BLOOD PRESSURE: 57 MMHG | RESPIRATION RATE: 17 BRPM

## 2017-05-29 VITALS
RESPIRATION RATE: 18 BRPM | HEART RATE: 87 BPM | DIASTOLIC BLOOD PRESSURE: 61 MMHG | TEMPERATURE: 98.5 F | OXYGEN SATURATION: 98 % | SYSTOLIC BLOOD PRESSURE: 132 MMHG

## 2017-05-29 VITALS
RESPIRATION RATE: 18 BRPM | HEART RATE: 80 BPM | OXYGEN SATURATION: 99 % | DIASTOLIC BLOOD PRESSURE: 53 MMHG | SYSTOLIC BLOOD PRESSURE: 100 MMHG | TEMPERATURE: 98.2 F

## 2017-05-29 VITALS
HEART RATE: 76 BPM | DIASTOLIC BLOOD PRESSURE: 51 MMHG | RESPIRATION RATE: 18 BRPM | TEMPERATURE: 96.7 F | OXYGEN SATURATION: 99 % | SYSTOLIC BLOOD PRESSURE: 89 MMHG

## 2017-05-29 VITALS
TEMPERATURE: 98.2 F | RESPIRATION RATE: 18 BRPM | SYSTOLIC BLOOD PRESSURE: 104 MMHG | DIASTOLIC BLOOD PRESSURE: 61 MMHG | OXYGEN SATURATION: 98 % | HEART RATE: 70 BPM

## 2017-05-29 VITALS
SYSTOLIC BLOOD PRESSURE: 134 MMHG | TEMPERATURE: 98.8 F | RESPIRATION RATE: 18 BRPM | HEART RATE: 89 BPM | DIASTOLIC BLOOD PRESSURE: 75 MMHG | OXYGEN SATURATION: 97 %

## 2017-05-29 VITALS
RESPIRATION RATE: 20 BRPM | SYSTOLIC BLOOD PRESSURE: 94 MMHG | TEMPERATURE: 98 F | OXYGEN SATURATION: 98 % | DIASTOLIC BLOOD PRESSURE: 52 MMHG | HEART RATE: 82 BPM

## 2017-05-29 RX ADMIN — PHENYTOIN SODIUM SCH MLS/HR: 50 INJECTION INTRAMUSCULAR; INTRAVENOUS at 10:43

## 2017-05-29 RX ADMIN — TAZOBACTAM SODIUM AND PIPERACILLIN SODIUM SCH MLS/HR: 375; 3 INJECTION, SOLUTION INTRAVENOUS at 14:14

## 2017-05-29 RX ADMIN — OXYCODONE HYDROCHLORIDE AND ACETAMINOPHEN PRN TAB: 5; 325 TABLET ORAL at 00:16

## 2017-05-29 RX ADMIN — PHENYTOIN SODIUM SCH MLS/HR: 50 INJECTION INTRAMUSCULAR; INTRAVENOUS at 02:22

## 2017-05-29 RX ADMIN — TAZOBACTAM SODIUM AND PIPERACILLIN SODIUM SCH MLS/HR: 375; 3 INJECTION, SOLUTION INTRAVENOUS at 02:04

## 2017-05-29 RX ADMIN — ONDANSETRON PRN MG: 2 INJECTION, SOLUTION INTRAMUSCULAR; INTRAVENOUS at 10:45

## 2017-05-29 RX ADMIN — OXYCODONE HYDROCHLORIDE AND ACETAMINOPHEN PRN TAB: 5; 325 TABLET ORAL at 11:15

## 2017-05-29 RX ADMIN — Medication SCH ML: at 21:00

## 2017-05-29 RX ADMIN — TAZOBACTAM SODIUM AND PIPERACILLIN SODIUM SCH MLS/HR: 375; 3 INJECTION, SOLUTION INTRAVENOUS at 21:14

## 2017-05-29 RX ADMIN — PANTOPRAZOLE SODIUM SCH MG: 40 INJECTION, POWDER, FOR SOLUTION INTRAVENOUS at 08:11

## 2017-05-29 RX ADMIN — Medication SCH ML: at 08:51

## 2017-05-29 RX ADMIN — PHENYTOIN SODIUM SCH MLS/HR: 50 INJECTION INTRAMUSCULAR; INTRAVENOUS at 18:28

## 2017-05-29 RX ADMIN — TAZOBACTAM SODIUM AND PIPERACILLIN SODIUM SCH MLS/HR: 375; 3 INJECTION, SOLUTION INTRAVENOUS at 08:10

## 2017-05-29 NOTE — HHI.PR
Subjective


Subjective Notes


RUQ pain controlled





Objective


Vitals/I&O





Vital Signs








  Date Time  Temp Pulse Resp B/P Pulse Ox O2 Delivery O2 Flow Rate FiO2


 


5/29/17 03:41 98.5 87 18 132/61 98   


 


5/28/17 19:24      Room Air  








Labs





Laboratory Tests








Test 5/28/17 5/28/17





 17:00 17:25


 


White Blood Count 10.6  


 


Red Blood Count 4.91  


 


Hemoglobin 15.1  


 


Hematocrit 44.6  


 


Mean Corpuscular Volume 90.7  


 


Mean Corpuscular Hemoglobin 30.7  


 


Mean Corpuscular Hemoglobin 33.9  





Concent  


 


Red Cell Distribution Width 13.0  


 


Platelet Count 314  


 


Mean Platelet Volume 8.9  


 


Neutrophils (%) (Auto) 73.9  


 


Lymphocytes (%) (Auto) 19.2  


 


Monocytes (%) (Auto) 5.0  


 


Eosinophils (%) (Auto) 1.5  


 


Basophils (%) (Auto) 0.4  


 


Neutrophils # (Auto) 7.9  


 


Lymphocytes # (Auto) 2.0  


 


Monocytes # (Auto) 0.5  


 


Eosinophils # (Auto) 0.2  


 


Basophils # (Auto) 0.0  


 


CBC Comment DIFF FINAL  


 


Differential Comment   


 


Prothrombin Time 10.2  


 


Prothromb Time International 0.9  





Ratio  


 


Activated Partial 28.0  





Thromboplast Time  


 


Sodium Level 139  


 


Potassium Level 3.5  


 


Chloride Level 102  


 


Carbon Dioxide Level 28.2  


 


Anion Gap 9  


 


Blood Urea Nitrogen 13  


 


Creatinine 0.95  


 


Estimat Glomerular Filtration 74  





Rate  


 


Random Glucose 111  


 


Calcium Level 8.8  


 


Total Bilirubin 0.3  


 


Aspartate Amino Transf 21  





(AST/SGOT)  


 


Alanine Aminotransferase 24  





(ALT/SGPT)  


 


Alkaline Phosphatase 81  


 


Total Protein 7.4  


 


Albumin 3.7  


 


Lipase 199  


 


Urine Color  LIGHT-YELLOW 


 


Urine Turbidity  CLEAR 


 


Urine pH  6.5 


 


Urine Specific Gravity  1.005 


 


Urine Protein  NEG 


 


Urine Glucose (UA)  NEG 


 


Urine Ketones  NEG 


 


Urine Occult Blood  NEG 


 


Urine Nitrite  NEG 


 


Urine Bilirubin  NEG 


 


Urine Urobilinogen  LESS THAN 2.0 


 


Urine Leukocyte Esterase  NEG 


 


Urine RBC  LESS THAN 1 


 


Urine WBC  LESS THAN 1 


 


Urine Squamous Epithelial  1 





Cells  


 


Microscopic Urinalysis Comment  CULT NOT





  INDICATED








Cardiovascular:  Regular


Lungs:  Clear


Abdomen:  Non-distended


Extremities:  No edema, Perfused


Narrative Exam


RUQ pain





A/P


Assessment and Plan


20yo female with acute cholecystitis, stable.  continue ABX, increase pain meds 

as needed.  for lap dejah tomorrow.








Omer Finnegan MD May 29, 2017 07:12

## 2017-05-30 VITALS
TEMPERATURE: 95.5 F | HEART RATE: 97 BPM | SYSTOLIC BLOOD PRESSURE: 111 MMHG | DIASTOLIC BLOOD PRESSURE: 66 MMHG | RESPIRATION RATE: 16 BRPM | OXYGEN SATURATION: 97 %

## 2017-05-30 VITALS
SYSTOLIC BLOOD PRESSURE: 101 MMHG | TEMPERATURE: 97.9 F | RESPIRATION RATE: 16 BRPM | HEART RATE: 72 BPM | DIASTOLIC BLOOD PRESSURE: 56 MMHG | OXYGEN SATURATION: 96 %

## 2017-05-30 VITALS
SYSTOLIC BLOOD PRESSURE: 99 MMHG | RESPIRATION RATE: 18 BRPM | HEART RATE: 82 BPM | DIASTOLIC BLOOD PRESSURE: 57 MMHG | TEMPERATURE: 98 F | OXYGEN SATURATION: 99 %

## 2017-05-30 VITALS
TEMPERATURE: 97.9 F | SYSTOLIC BLOOD PRESSURE: 101 MMHG | RESPIRATION RATE: 16 BRPM | HEART RATE: 72 BPM | DIASTOLIC BLOOD PRESSURE: 56 MMHG | OXYGEN SATURATION: 96 %

## 2017-05-30 VITALS
HEART RATE: 91 BPM | TEMPERATURE: 97.4 F | RESPIRATION RATE: 20 BRPM | SYSTOLIC BLOOD PRESSURE: 111 MMHG | DIASTOLIC BLOOD PRESSURE: 70 MMHG | OXYGEN SATURATION: 98 %

## 2017-05-30 VITALS
HEART RATE: 82 BPM | SYSTOLIC BLOOD PRESSURE: 99 MMHG | RESPIRATION RATE: 18 BRPM | OXYGEN SATURATION: 99 % | TEMPERATURE: 98 F | DIASTOLIC BLOOD PRESSURE: 57 MMHG

## 2017-05-30 VITALS
TEMPERATURE: 98.1 F | RESPIRATION RATE: 16 BRPM | SYSTOLIC BLOOD PRESSURE: 90 MMHG | OXYGEN SATURATION: 98 % | HEART RATE: 71 BPM | DIASTOLIC BLOOD PRESSURE: 51 MMHG

## 2017-05-30 RX ADMIN — Medication SCH ML: at 20:44

## 2017-05-30 RX ADMIN — ONDANSETRON PRN MG: 2 INJECTION, SOLUTION INTRAMUSCULAR; INTRAVENOUS at 21:52

## 2017-05-30 RX ADMIN — TAZOBACTAM SODIUM AND PIPERACILLIN SODIUM SCH MLS/HR: 375; 3 INJECTION, SOLUTION INTRAVENOUS at 01:35

## 2017-05-30 RX ADMIN — PHENYTOIN SODIUM SCH MLS/HR: 50 INJECTION INTRAMUSCULAR; INTRAVENOUS at 16:15

## 2017-05-30 RX ADMIN — TAZOBACTAM SODIUM AND PIPERACILLIN SODIUM SCH MLS/HR: 375; 3 INJECTION, SOLUTION INTRAVENOUS at 08:24

## 2017-05-30 RX ADMIN — Medication SCH ML: at 08:26

## 2017-05-30 RX ADMIN — PHENYTOIN SODIUM SCH MLS/HR: 50 INJECTION INTRAMUSCULAR; INTRAVENOUS at 08:25

## 2017-05-30 RX ADMIN — CLONIDINE HYDROCHLORIDE PRN MG: 0.1 INJECTION, SOLUTION EPIDURAL at 15:55

## 2017-05-30 RX ADMIN — CLONIDINE HYDROCHLORIDE PRN MG: 0.1 INJECTION, SOLUTION EPIDURAL at 21:51

## 2017-05-30 RX ADMIN — PANTOPRAZOLE SODIUM SCH MG: 40 INJECTION, POWDER, FOR SOLUTION INTRAVENOUS at 08:25

## 2017-05-30 RX ADMIN — PHENYTOIN SODIUM SCH MLS/HR: 50 INJECTION INTRAMUSCULAR; INTRAVENOUS at 01:38

## 2017-05-30 RX ADMIN — TAZOBACTAM SODIUM AND PIPERACILLIN SODIUM SCH MLS/HR: 375; 3 INJECTION, SOLUTION INTRAVENOUS at 12:44

## 2017-05-30 NOTE — HHI.PR
__________________________________________________





Immediate Post Op Note


Procedure Date:


May 30, 2017


Pre Op Diagnosis:  


(1) Acute cholecystitis


(2) Ovarian cyst


(3) Abdominal pain


Post Op Diagnosis:  


(1) Ovarian cyst


(2) Acute cholecystitis


(3) Abdominal pain


Surgeon:


Omer Finnegan


Assistant(s):


staff


Procedure:


laparoscopic Cholecystectomy, lap appendectomy


Findings:


mild inflammation and gallstones, normal ovaries and appendix


Complications:


none


Specimen(s) removed:


GB, appendix


Estimated blood loss:


10ml


Anesthesia:  General, Local


Drains:  None


Patient to:  PACU


Patient Condition:  Good








Omer Finnegan MD May 30, 2017 15:26

## 2017-05-31 VITALS
RESPIRATION RATE: 20 BRPM | SYSTOLIC BLOOD PRESSURE: 121 MMHG | TEMPERATURE: 97.4 F | HEART RATE: 91 BPM | DIASTOLIC BLOOD PRESSURE: 82 MMHG | OXYGEN SATURATION: 99 %

## 2017-05-31 VITALS
HEART RATE: 90 BPM | OXYGEN SATURATION: 94 % | TEMPERATURE: 96.7 F | DIASTOLIC BLOOD PRESSURE: 78 MMHG | RESPIRATION RATE: 17 BRPM | SYSTOLIC BLOOD PRESSURE: 123 MMHG

## 2017-05-31 VITALS
RESPIRATION RATE: 16 BRPM | HEART RATE: 97 BPM | DIASTOLIC BLOOD PRESSURE: 59 MMHG | OXYGEN SATURATION: 99 % | SYSTOLIC BLOOD PRESSURE: 88 MMHG | TEMPERATURE: 97.1 F

## 2017-05-31 VITALS — OXYGEN SATURATION: 97 %

## 2017-05-31 RX ADMIN — PHENYTOIN SODIUM SCH MLS/HR: 50 INJECTION INTRAMUSCULAR; INTRAVENOUS at 02:19

## 2017-05-31 RX ADMIN — CLONIDINE HYDROCHLORIDE PRN MG: 0.1 INJECTION, SOLUTION EPIDURAL at 04:58

## 2017-05-31 RX ADMIN — PHENYTOIN SODIUM SCH MLS/HR: 50 INJECTION INTRAMUSCULAR; INTRAVENOUS at 08:27

## 2017-05-31 RX ADMIN — PANTOPRAZOLE SODIUM SCH MG: 40 INJECTION, POWDER, FOR SOLUTION INTRAVENOUS at 08:26

## 2017-05-31 RX ADMIN — Medication SCH ML: at 08:27

## 2017-05-31 NOTE — MP
cc:

LOUISE BHANDARI

****

 

 

DATE OF SURGERY

05/30/2017

 

PREOPERATIVE DIAGNOSES

1. Acute cholecystitis.

2. Cholelithiasis.

3. Abdominal pain.

4. History of ovarian cyst.

 

POSTOPERATIVE DIAGNOSES

1. Acute cholecystitis.

2. Cholelithiasis.

3. Abdominal pain.

4. History of ovarian cyst.

 

PROCEDURE

1. Diagnostic laparoscopy.

2. Laparoscopic cholecystectomy.

3. Laparoscopic appendectomy.

 

ANESTHESIA

General and local anesthetic.

 

ATTENDING SURGEON

Louise Bhandari MD

 

ASSISTANTS

Staff.

 

ESTIMATED BLOOD LOSS

10 cc.

 

COMPLICATIONS

None.

 

FINDINGS

Normal-appearing ovaries.

Normal-appearing appendix.

Mildly inflamed gallbladder with one large gallstone in the neck of the

gallbladder.

 

 

 

INDICATION FOR PROCEDURE

The patient is a 21-year-old female with approximately six weeks of abdominal

pain.  The patient experienced some vomiting and on workup did have a gallstone

as well as some ovarian cysts.  The patient was admitted for severe pain

requiring IV medication and to rule out cholecystitis over the weekend.  The

patient continues to have persistent pain and discussion with the patient about

the potential management.  I did recommend laparoscopic cholecystectomy as it

is likely the cause of the patient's symptoms, early acute cholecystitis to her

gallstone.  We also discussed with the patient about performing appendectomy

and diagnostic laparoscopy to evaluate for any other pathology.  The patient

did have a strong family history of gallbladder as well as appendix disorders

and she did desire to have her appendix removed as well as the gallbladder as

well as some evaluation of her ovaries intraoperatively.  The risks, benefits

and alternatives to the above procedure were discussed with the patient and the

patient agreed to undergo the procedure.

 

PROCEDURE

Informed consent was obtained.  The patient was taken to the operating room and

placed in supine position and placed under general endotracheal anesthesia.

The patient's abdomen was prepped and draped in a sterile fashion.   Time-out

was performed.

 

The abdomen was entered with Nyla type technique under direct visualization.

We made a curvilinear incision infraumbilically and took this down through the

subcutaneous tissue and opened the midline fascia with a 15 blade scalpel.  We

directly entered the abdomen with retractors and placed a 10-mm balloon trocar

into the abdomen under direct visualization.  We insufflated the abdomen,

surveyed the abdomen with a 5-mm, 30-degree camera.  There was no evidence of

any complication from our entry.  We placed three 5-mm ports in the right upper

quadrant under visualization of the laparoscope.  Local anesthetic was used at

all port sites.

 

We then were able to direct our attention towards cholecystectomy, grasped the

gallbladder at the fundus, retracted it upward.  We were able to use the hook

electrocautery as well as [place] dissector to dissect out the triangle of

[place].  The critical view was obtained.  There was some mild inflammation of

the gallbladder but there was no significant edema or any sign of any severe

pathology.  There was a large gallstone in the neck of the gallbladder that was

somewhat stuck.  We then divided the cystic duct and cystic artery after we had

clipped proximal and distal.  We took the gallbladder off the gallbladder fossa

with hook electrocautery, removed the gallbladder from the abdomen through the

periumbilical port with EndoCatch bag.

 

We then turned out attention to diagnostic laparoscopy and possible

appendectomy.  We did evaluate the patient's appendix which appeared mildly

dilated at the midportion of the appendix but there was no obvious abnormality.

We did look at the ovaries; the right ovary was larger than the left with no

obvious pathology or abnormality in the pelvis.  We then performed appendectomy

by using a single gray load in the [employer] [place] stapler, divided the base of

the appendix which was splayed into the tinea as well as the very small

mesentery which was traveling along with the appendix.  There was no bleeding

at this staple line and the staple line was intact and viable.  We then removed

the appendix from the abdomen through the 10-mm port, through the port sheath.

We then again visualized the abdomen.  There was again no evidence of any of

bleeding or complication from our procedure.  We did use some irrigation to

irrigate and suction out the right upper quadrant until all suctioning was

clear.  We removed ports under visualization of the laparoscope and expressed

the pneumoperitoneum.

 

We closed the Nyla entry site with a figure-of-eight 0 Vicryl suture on the

fascia, closed the skin with 4-0 Monocryl and Dermabond.  The patient was

discontinued from anesthesia and taken to the PACU in stable condition.

 

The patient tolerated procedure well.  No apparent complications.  All counts

were correct.

 

I was present and scrubbed for the entire procedure.

 

 

                              _________________________________

                              MD JAIME Abbott/NOEL

D:  5/30/2017/3:30 PM

T:  5/31/2017/5:59 AM

Visit #:  [id number]

Job #:  [id number]

## 2017-05-31 NOTE — HHI.DS
Discharge Summary


Admission Date


May 28, 2017 at 18:23


Discharge Date:  May 31, 2017


Admitting Diagnosis


cholelithiasis.  Abdominal pain.





Brief History


21 year old female presents to the ED with abdominal pain.


CBC/BMP:  


5/28/17 1700                                                                   

             5/28/17 1700





Significant Findings





Laboratory Tests








Test 5/28/17





 17:00


 


Neutrophils (%) (Auto) 73.9 %





 (16.0-70.0)


 


Neutrophils # (Auto) 7.9 TH/MM3





 (1.8-7.7)


 


Estimat Glomerular Filtration 74 ML/MIN (>89)





Rate 


 


Random Glucose 111 MG/DL





 ()








PE at Discharge


Alert and awake sitting up in bed


Cardio: RRR


Resp: CTAB


Abd: post op tenderness; lap sites c/d/i with skin glue


Hospital Course


This is a 21 year old female POD1 laparoscopic cholecystectomy and laparoscopic 

appendectomy.  She was able to tolerate a regular diet prior to DC.  Her pain 

was controlled using Ultram. She was provided a prescription for this.  She 

will follow up in the office as indicated on the DC information.  She is not to 

return to work until seen at the follow up visit in the office.


Pt Condition on Discharge:  Good


Discharge Disposition:  Discharge Home


Discharge Instructions


DIET: Follow Instructions for:  As Tolerated, No Restrictions, Low Fat Diet


Activities you can perform:  See Additionl Instruction


Other Activity Instructions:  


Okay to shower today





No heavy pulling pushing or lifting





Attending Statement


The exam, history, and the medical decision-making described in the above note 

were completed with the assistance of the mid-level provider. I reviewed and 

agree with the findings presented.  I attest that I had a face-to-face 

encounter with the patient on the same day, and personally performed and 

documented my assessment and findings in the medical record.





pain controlled, abdominal exam stable postop, ok to DC home today








Nora Nava May 31, 2017 15:27


Omer Finnegan MD Jun 1, 2017 13:43

## 2018-01-10 ENCOUNTER — HOSPITAL ENCOUNTER (EMERGENCY)
Dept: HOSPITAL 17 - NED | Age: 23
Discharge: LEFT BEFORE DISCHARGE | End: 2018-01-10
Payer: SELF-PAY

## 2018-01-10 VITALS
SYSTOLIC BLOOD PRESSURE: 126 MMHG | OXYGEN SATURATION: 98 % | DIASTOLIC BLOOD PRESSURE: 73 MMHG | RESPIRATION RATE: 18 BRPM | HEART RATE: 86 BPM | TEMPERATURE: 98.8 F

## 2018-01-10 DIAGNOSIS — Z04.9: Primary | ICD-10-CM

## 2018-01-10 PROCEDURE — 99281 EMR DPT VST MAYX REQ PHY/QHP: CPT
